# Patient Record
Sex: MALE | Race: WHITE | Employment: FULL TIME | URBAN - METROPOLITAN AREA
[De-identification: names, ages, dates, MRNs, and addresses within clinical notes are randomized per-mention and may not be internally consistent; named-entity substitution may affect disease eponyms.]

---

## 2017-10-03 ENCOUNTER — HOSPITAL ENCOUNTER (OUTPATIENT)
Dept: LAB | Age: 27
Discharge: HOME | End: 2017-10-03

## 2017-10-03 ENCOUNTER — HOSPITAL ENCOUNTER (OUTPATIENT)
Dept: ULTRASOUND IMAGING | Age: 27
Discharge: HOME | End: 2017-10-03
Payer: COMMERCIAL

## 2017-10-03 DIAGNOSIS — R74.8 ELEVATED LIVER ENZYMES: ICD-10-CM

## 2017-10-03 LAB — SENTARA SPECIMEN COL,SENBCF: NORMAL

## 2017-10-03 PROCEDURE — 76705 ECHO EXAM OF ABDOMEN: CPT

## 2017-10-03 PROCEDURE — 99001 SPECIMEN HANDLING PT-LAB: CPT | Performed by: INTERNAL MEDICINE

## 2020-06-29 ENCOUNTER — HOSPITAL ENCOUNTER (INPATIENT)
Age: 30
LOS: 1 days | Discharge: HOME | DRG: 392 | End: 2020-06-30
Attending: EMERGENCY MEDICINE | Admitting: HOSPITALIST
Payer: COMMERCIAL

## 2020-06-29 ENCOUNTER — APPOINTMENT (OUTPATIENT)
Dept: CT IMAGING | Age: 30
DRG: 392 | End: 2020-06-29
Attending: PHYSICIAN ASSISTANT
Payer: COMMERCIAL

## 2020-06-29 DIAGNOSIS — K40.20 BILATERAL INGUINAL HERNIA WITHOUT OBSTRUCTION OR GANGRENE, RECURRENCE NOT SPECIFIED: ICD-10-CM

## 2020-06-29 DIAGNOSIS — K57.20 ABSCESS OF SIGMOID COLON DUE TO DIVERTICULITIS: Primary | ICD-10-CM

## 2020-06-29 DIAGNOSIS — K57.92 DIVERTICULITIS: ICD-10-CM

## 2020-06-29 PROBLEM — K65.1 PELVIC ABSCESS IN MALE (HCC): Status: ACTIVE | Noted: 2020-06-29

## 2020-06-29 PROBLEM — I10 HTN (HYPERTENSION): Status: ACTIVE | Noted: 2020-06-29

## 2020-06-29 LAB
ALBUMIN SERPL-MCNC: 4 G/DL (ref 3.4–5)
ALBUMIN/GLOB SERPL: 1.1 {RATIO} (ref 0.8–1.7)
ALP SERPL-CCNC: 68 U/L (ref 45–117)
ALT SERPL-CCNC: 29 U/L (ref 16–61)
ANION GAP SERPL CALC-SCNC: 3 MMOL/L (ref 3–18)
AST SERPL-CCNC: 18 U/L (ref 10–38)
BASOPHILS # BLD: 0 K/UL (ref 0–0.1)
BASOPHILS NFR BLD: 0 % (ref 0–2)
BILIRUB SERPL-MCNC: 0.7 MG/DL (ref 0.2–1)
BUN SERPL-MCNC: 9 MG/DL (ref 7–18)
BUN/CREAT SERPL: 10 (ref 12–20)
CALCIUM SERPL-MCNC: 9.1 MG/DL (ref 8.5–10.1)
CHLORIDE SERPL-SCNC: 104 MMOL/L (ref 100–111)
CO2 SERPL-SCNC: 31 MMOL/L (ref 21–32)
CREAT SERPL-MCNC: 0.93 MG/DL (ref 0.6–1.3)
DIFFERENTIAL METHOD BLD: ABNORMAL
EOSINOPHIL # BLD: 0.1 K/UL (ref 0–0.4)
EOSINOPHIL NFR BLD: 1 % (ref 0–5)
ERYTHROCYTE [DISTWIDTH] IN BLOOD BY AUTOMATED COUNT: 11.9 % (ref 11.6–14.5)
GLOBULIN SER CALC-MCNC: 3.6 G/DL (ref 2–4)
GLUCOSE SERPL-MCNC: 106 MG/DL (ref 74–99)
HCT VFR BLD AUTO: 44.5 % (ref 36–48)
HGB BLD-MCNC: 16.1 G/DL (ref 13–16)
LYMPHOCYTES # BLD: 1.4 K/UL (ref 0.9–3.6)
LYMPHOCYTES NFR BLD: 16 % (ref 21–52)
MCH RBC QN AUTO: 31.9 PG (ref 24–34)
MCHC RBC AUTO-ENTMCNC: 36.2 G/DL (ref 31–37)
MCV RBC AUTO: 88.1 FL (ref 74–97)
MONOCYTES # BLD: 1.1 K/UL (ref 0.05–1.2)
MONOCYTES NFR BLD: 13 % (ref 3–10)
NEUTS SEG # BLD: 6.1 K/UL (ref 1.8–8)
NEUTS SEG NFR BLD: 70 % (ref 40–73)
PLATELET # BLD AUTO: 263 K/UL (ref 135–420)
PMV BLD AUTO: 9.2 FL (ref 9.2–11.8)
POTASSIUM SERPL-SCNC: 4.2 MMOL/L (ref 3.5–5.5)
PROT SERPL-MCNC: 7.6 G/DL (ref 6.4–8.2)
RBC # BLD AUTO: 5.05 M/UL (ref 4.7–5.5)
SODIUM SERPL-SCNC: 138 MMOL/L (ref 136–145)
WBC # BLD AUTO: 8.7 K/UL (ref 4.6–13.2)

## 2020-06-29 PROCEDURE — 96368 THER/DIAG CONCURRENT INF: CPT

## 2020-06-29 PROCEDURE — 65270000029 HC RM PRIVATE

## 2020-06-29 PROCEDURE — 74177 CT ABD & PELVIS W/CONTRAST: CPT

## 2020-06-29 PROCEDURE — 96365 THER/PROPH/DIAG IV INF INIT: CPT

## 2020-06-29 PROCEDURE — 74011250636 HC RX REV CODE- 250/636: Performed by: PHYSICIAN ASSISTANT

## 2020-06-29 PROCEDURE — 74011636320 HC RX REV CODE- 636/320: Performed by: EMERGENCY MEDICINE

## 2020-06-29 PROCEDURE — 99284 EMERGENCY DEPT VISIT MOD MDM: CPT

## 2020-06-29 PROCEDURE — 85025 COMPLETE CBC W/AUTO DIFF WBC: CPT

## 2020-06-29 PROCEDURE — 80053 COMPREHEN METABOLIC PANEL: CPT

## 2020-06-29 PROCEDURE — 74011250636 HC RX REV CODE- 250/636: Performed by: INTERNAL MEDICINE

## 2020-06-29 PROCEDURE — 96375 TX/PRO/DX INJ NEW DRUG ADDON: CPT

## 2020-06-29 RX ORDER — CIPROFLOXACIN 2 MG/ML
400 INJECTION, SOLUTION INTRAVENOUS
Status: COMPLETED | OUTPATIENT
Start: 2020-06-29 | End: 2020-06-29

## 2020-06-29 RX ORDER — HEPARIN SODIUM 5000 [USP'U]/ML
5000 INJECTION, SOLUTION INTRAVENOUS; SUBCUTANEOUS EVERY 8 HOURS
Status: DISCONTINUED | OUTPATIENT
Start: 2020-06-29 | End: 2020-06-30 | Stop reason: HOSPADM

## 2020-06-29 RX ORDER — CIPROFLOXACIN 2 MG/ML
400 INJECTION, SOLUTION INTRAVENOUS EVERY 12 HOURS
Status: DISCONTINUED | OUTPATIENT
Start: 2020-06-30 | End: 2020-06-30 | Stop reason: HOSPADM

## 2020-06-29 RX ORDER — METRONIDAZOLE 500 MG/100ML
500 INJECTION, SOLUTION INTRAVENOUS EVERY 6 HOURS
Status: DISCONTINUED | OUTPATIENT
Start: 2020-06-30 | End: 2020-06-30 | Stop reason: HOSPADM

## 2020-06-29 RX ORDER — SODIUM CHLORIDE, SODIUM LACTATE, POTASSIUM CHLORIDE, CALCIUM CHLORIDE 600; 310; 30; 20 MG/100ML; MG/100ML; MG/100ML; MG/100ML
100 INJECTION, SOLUTION INTRAVENOUS CONTINUOUS
Status: DISCONTINUED | OUTPATIENT
Start: 2020-06-29 | End: 2020-06-30 | Stop reason: HOSPADM

## 2020-06-29 RX ORDER — MORPHINE SULFATE 2 MG/ML
2 INJECTION, SOLUTION INTRAMUSCULAR; INTRAVENOUS
Status: COMPLETED | OUTPATIENT
Start: 2020-06-29 | End: 2020-06-29

## 2020-06-29 RX ORDER — METRONIDAZOLE 500 MG/100ML
500 INJECTION, SOLUTION INTRAVENOUS
Status: COMPLETED | OUTPATIENT
Start: 2020-06-29 | End: 2020-06-29

## 2020-06-29 RX ADMIN — HEPARIN SODIUM 5000 UNITS: 5000 INJECTION INTRAVENOUS; SUBCUTANEOUS at 22:59

## 2020-06-29 RX ADMIN — MORPHINE SULFATE 2 MG: 2 INJECTION, SOLUTION INTRAMUSCULAR; INTRAVENOUS at 15:58

## 2020-06-29 RX ADMIN — CIPROFLOXACIN 400 MG: 2 INJECTION, SOLUTION INTRAVENOUS at 17:34

## 2020-06-29 RX ADMIN — SODIUM CHLORIDE 1000 ML: 900 INJECTION, SOLUTION INTRAVENOUS at 17:34

## 2020-06-29 RX ADMIN — METRONIDAZOLE 500 MG: 500 INJECTION, SOLUTION INTRAVENOUS at 23:54

## 2020-06-29 RX ADMIN — IOPAMIDOL 98 ML: 612 INJECTION, SOLUTION INTRAVENOUS at 16:28

## 2020-06-29 RX ADMIN — SODIUM CHLORIDE, SODIUM LACTATE, POTASSIUM CHLORIDE, AND CALCIUM CHLORIDE 100 ML/HR: 600; 310; 30; 20 INJECTION, SOLUTION INTRAVENOUS at 21:47

## 2020-06-29 RX ADMIN — METRONIDAZOLE 500 MG: 500 INJECTION, SOLUTION INTRAVENOUS at 17:34

## 2020-06-29 NOTE — ED TRIAGE NOTES
Pt presents for LLQ pain/scrotal pain and dysuria s/p lifiting \"481 lb hydraulic\" Pt thinks he has a hernia. Unable to go back in per pt.  Has had BM since then

## 2020-06-29 NOTE — ED PROVIDER NOTES
[address] EMERGENCY DEPT      Date: 6/29/2020  Patient Name: Debora Barber    History of Presenting Illness     Chief Complaint   Patient presents with    Abdominal Pain     26 y.o. male otherwise healthy presents the ED complaining of right lower pelvic pain onset earlier this morning. Patient states he had gotten to work early and was attempting to lift a 500 pound cylinder by himself when he felt a pain/swelling to his pelvis. Pt had a prior right inguinal hernia repair with mesh and states this feels similar to when he had the hernia. He reports having normal bowel movement today. Denies any fever, chills, nausea or vomiting, diarrhea or constipation, urinary complaints, other symptoms at this time. Patient denies any other associated signs or symptoms. Patient denies any other complaints. Nursing notes regarding the HPI and triage nursing notes were reviewed. Prior medical records were reviewed. Current Facility-Administered Medications   Medication Dose Route Frequency Provider Last Rate Last Dose    ciprofloxacin (CIPRO) 400 mg in D5W IVPB (premix)  400 mg IntraVENous NOW Brenna Sawant  mL/hr at 06/29/20 1734 400 mg at 06/29/20 1734    metroNIDAZOLE (FLAGYL) IVPB premix 500 mg  500 mg IntraVENous NOW TOMASZ Ryder 100 mL/hr at 06/29/20 1734 500 mg at 06/29/20 1734       Past History     Past Medical History:  History reviewed. No pertinent past medical history. Past Surgical History:  Past Surgical History:   Procedure Laterality Date    HX HERNIA REPAIR  2-23-10    right inguinal hernia repair with mesh       Family History:  History reviewed. No pertinent family history. Social History:  Social History     Tobacco Use    Smoking status: Current Every Day Smoker    Smokeless tobacco: Never Used   Substance Use Topics    Alcohol use:  Yes    Drug use: No       Allergies:  No Known Allergies    Patient's primary care provider (as noted in EPIC):  Maxwell Jimenez Mitch Hameed MD    Review of Systems   Constitutional:  Denies malaise, fever, chills. GI/ABD:  Denies injury, pain, distention, nausea, vomiting, diarrhea. :  Denies injury, pain, dysuria or urgency. Back:  Denies injury or pain. Pelvis: + right inguinal/pelvic pain. Neuro:  Denies neurologic symptoms/deficits/paresthesias. Skin: Denies injury, rash, itching or skin changes. All other systems negative as reviewed. Visit Vitals  BP (!) 137/94   Pulse (!) 108   Temp 97.9 °F (36.6 °C)   Resp 15   Wt 77.1 kg (170 lb)   SpO2 97%   BMI 23.71 kg/m²       Patient Vitals for the past 12 hrs:   Temp Pulse Resp BP SpO2   06/29/20 1444 97.9 °F (36.6 °C) (!) 108 15 (!) 137/94 97 %       PHYSICAL EXAM:    CONSTITUTIONAL:  Alert, in no apparent distress;  well developed;  well nourished. HEAD:  Normocephalic, atraumatic. EYES:  EOMI. Non-icteric sclera. Normal conjunctiva. ENTM:  Mouth: mucous membranes moist.  NECK:  Supple  RESPIRATORY:  Chest clear, equal breath sounds, good air movement. Without wheezes, rhonchi or rales. CARDIOVASCULAR:  Regular rate and rhythm. No murmurs, rubs, or gallops. GI:  Normal bowel sounds, abdomen soft with mild TTP to suprapubic region. No rebound or guarding. BACK:  Non-tender. NEURO:  Moves all four extremities, and grossly normal motor exam.  SKIN:  No rashes;  Normal for age. PSYCH:  Alert and normal affect. DIFFERENTIAL DIAGNOSES/ MEDICAL DECISION MAKING:  DDX: Hernia, appendicitis pain, diverticulitis, urinary tract infection, obstruction, abdominal wall pain, vs other etiology.      Recent Results (from the past 12 hour(s))   CBC WITH AUTOMATED DIFF    Collection Time: 06/29/20  3:37 PM   Result Value Ref Range    WBC 8.7 4.6 - 13.2 K/uL    RBC 5.05 4.70 - 5.50 M/uL    HGB 16.1 (H) 13.0 - 16.0 g/dL    HCT 44.5 36.0 - 48.0 %    MCV 88.1 74.0 - 97.0 FL    MCH 31.9 24.0 - 34.0 PG    MCHC 36.2 31.0 - 37.0 g/dL    RDW 11.9 11.6 - 14.5 %    PLATELET 538 926 - 420 K/uL    MPV 9.2 9.2 - 11.8 FL    NEUTROPHILS 70 40 - 73 %    LYMPHOCYTES 16 (L) 21 - 52 %    MONOCYTES 13 (H) 3 - 10 %    EOSINOPHILS 1 0 - 5 %    BASOPHILS 0 0 - 2 %    ABS. NEUTROPHILS 6.1 1.8 - 8.0 K/UL    ABS. LYMPHOCYTES 1.4 0.9 - 3.6 K/UL    ABS. MONOCYTES 1.1 0.05 - 1.2 K/UL    ABS. EOSINOPHILS 0.1 0.0 - 0.4 K/UL    ABS. BASOPHILS 0.0 0.0 - 0.1 K/UL    DF AUTOMATED     METABOLIC PANEL, COMPREHENSIVE    Collection Time: 06/29/20  3:37 PM   Result Value Ref Range    Sodium 138 136 - 145 mmol/L    Potassium 4.2 3.5 - 5.5 mmol/L    Chloride 104 100 - 111 mmol/L    CO2 31 21 - 32 mmol/L    Anion gap 3 3.0 - 18 mmol/L    Glucose 106 (H) 74 - 99 mg/dL    BUN 9 7.0 - 18 MG/DL    Creatinine 0.93 0.6 - 1.3 MG/DL    BUN/Creatinine ratio 10 (L) 12 - 20      GFR est AA >60 >60 ml/min/1.73m2    GFR est non-AA >60 >60 ml/min/1.73m2    Calcium 9.1 8.5 - 10.1 MG/DL    Bilirubin, total 0.7 0.2 - 1.0 MG/DL    ALT (SGPT) 29 16 - 61 U/L    AST (SGOT) 18 10 - 38 U/L    Alk. phosphatase 68 45 - 117 U/L    Protein, total 7.6 6.4 - 8.2 g/dL    Albumin 4.0 3.4 - 5.0 g/dL    Globulin 3.6 2.0 - 4.0 g/dL    A-G Ratio 1.1 0.8 - 1.7        Ct Abd Pelv W Cont    Result Date: 6/29/2020  EXAM: CT of the abdomen and pelvis INDICATION: 26year-old patient with left lower quadrant abdominal/scrotal pain after lifting injury COMPARISON: None. TECHNIQUE: Axial CT imaging of the abdomen and pelvis was performed with intravenous contrast. Multiplanar reformats were generated. One or more dose reduction techniques were used on this CT: automated exposure control, adjustment of the mAs and/or kVp according to patient size, and iterative reconstruction techniques. The specific techniques used on this CT exam have been documented in the patient's electronic medical record.   Digital Imaging and Communications in Medicine (DICOM) format image data are available to nonaffiliated external healthcare facilities or entities on a secure, media free, reciprocally searchable basis with patient authorization for at least a 12-month period after this study. _______________ FINDINGS: LOWER CHEST: Unremarkable. LIVER, BILIARY: Mild diffuse hepatic hypoattenuation is present without evidence of focal hepatic lesion. No biliary dilation. Gallbladder is unremarkable. PANCREAS: Normal. SPLEEN: Normal. ADRENALS: Normal. 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 There is no evidence of hydronephrosis. Lobular renal contours are noted. No nephrolithiasis. No distal ureteral or urinary bladder stone. PELVIC ORGANS: Small fat-containing inguinal hernias are noted bilaterally without significant stranding. VASCULATURE: Unremarkable LYMPH NODES: No enlarged lymph nodes. GASTROINTESTINAL TRACT: There is focal peridiverticular inflammation involving the sigmoid colon with induration of the pericolonic fat planes, colonic wall thickening, and small quantity of reactive fluid along the adjacent mesentery and inferior left paracolic gutter. Focal low attenuating gas and fluid collection within or adjacent to the inflamed segment of the colon estimated at approximately 2.5 x 1.3 x 1.9 cm in maximal dimension. There is no gross free intraperitoneal gas demonstrated. Remaining small and large bowel are of normal course and caliber. Normal appendix. BONES: No acute or aggressive osseous abnormalities identified. Lower lumbar spondylosis, mild in nature L5-S1 with accompanying vacuum disc phenomenon. OTHER: None. _______________     IMPRESSION: 1. Acute sigmoid diverticulitis without evidence of gross free intraperitoneal perforation. > Small incompletely rim-enhancing fluid and gas collection adjacent to the inflamed colonic segment measuring approximately 2.5 x 1.3 x 1.9 cm in size. Portions of the collection may be intramural in location. 2. Small bilateral fat-containing inguinal hernias.      ED COURSE AND MEDICAL DECISION MAKING:      IV cirpo and flagyl ordered for patient due to diverticulitis with abscess formation. 5:25 PM Dr. Joselyn Briceño spoke with Dr. Tracy Warner who states the abscess is too small to operate on he is requesting admission to hospitalist as well as IR consult. 5:37 PM Consult with Dr. Conchis Negron who states he will accept the patient for admission. Informed Oralia, , I need to consult IR. She states they are not here and will attempt to call them but does not know if we will be able to get in touch with them this evening. Diagnosis:   1. Abscess of sigmoid colon due to diverticulitis    2.  Bilateral inguinal hernia without obstruction or gangrene, recurrence not specified      Disposition: Admission     TOMASZ Burgess

## 2020-06-29 NOTE — H&P
Internal Medicine History and Physical  Note           NAME:  Claudia Gerber   :   1990   MRN:  [id number]     PCP:  Deshaun Dwyer MD     Date/Time:  2020 5:32 PM      I hereby certify this patient for admission based upon medical necessity as noted below:        Assessment / plan :        Principal Problem:    Diverticulitis (2020)    Active Problems:    HTN (hypertension) (2020)      Pelvic abscess in male [facility]) (2020)       #Acute diverticulitis with pelvic abscess. Admit to hospital for further management of acute diverticulitis. Continue IV Cipro and IV Flagyl. IV fluid  Of note patient is afebrile, no leukocytosis no active nausea or vomiting or change in bowel habits apart from constipation on Thursday. Repeat CT scan to follow-up on a small pelvic abscess  Serial labs as ordered  Surgery consulted by ER MD    # Hypertension. Control blood pressure  Patient report he supposed to be on blood pressure medicine started by PCP but he never took it as he did not feel well when he used it. Cannot remember the name of the medicine. -DVT prophylaxis . - Code Status : Full    -Other chronic medical problems as per past history. Further management depend on the course of the case and expanded data base. DISPO - pt to be admitted at this time for reasons addressed above, continued hospitalization for ongoing assessment and treatment indicated        Subjective:     CHIEF COMPLAINT: Lower abdominal pain    HISTORY OF PRESENT ILLNESS:     Mr. Renato Beckwith is a 26 y.o.  male who is admitted for acute diverticulitis with pelvic abscess. Mr. Renato Beckwith with past medical history as noted below , presented to the Emergency Department today  complaining of above. Triage and ER notes noted. Patient reported that he was lifting a heavy object at work today and suddenly started to experience lower abdominal pain and he thought he might have hernia. In the ER CT scan report acute diverticulitis in the sigmoid area and also small pelvic abscess about 2 cm. Patient was surprised with this diagnosis as he never had any problems the last couple of days but on further questioning he report on Thursday he developed some nauseated and was constipated but no fever or chills. Patient reported hernia surgery with mesh about 10 years ago otherwise no abdominal infections or other surgeries. He reports normal bowel habit normally and eating normal.  No urinary complaint. History reviewed. No pertinent past medical history. Past Surgical History:   Procedure Laterality Date    HX HERNIA REPAIR  2-23-10    right inguinal hernia repair with mesh       Social History     Tobacco Use    Smoking status: Current Every Day Smoker    Smokeless tobacco: Never Used   Substance Use Topics    Alcohol use: Yes        History reviewed. No pertinent family history.      No Known Allergies     Prior to Admission medications    Not on File       Review of Systems:  (bold if positive, otherwise negative), apart from what mentioned in HPI  Apart from above patient deny any other significant complain  Gen:  Weight gain, weight loss, fever, chills, fatigue  Eyes:  Visual changes, pain, conjunctivitis  ENT:  Sore throat, rhinorrhea, decreased hearing  CVS:  Palpitations, chest pain, dizziness, syncope, edema, PND  Pulm:  Cough, dyspnea, sputum, hemoptysis, wheezing  GI:  Abdominal pain, nausea, emesis, diarrhea, constipation, GERD, melena  :  Hematuria, incontinence, nocturia, dysuria, discharge  MS:  Pain, weakness, swelling, arthritis  Skin:  Rash, erythema, abscess, wound, moles  Endo:  Heat intolerance, cold intolerance, weight gain, polyuria  Hem:  Enlarged nodes, bruising, bleeding, night sweats  Renal:  Edema, change in urine, flank pain  Neuro:  Numbness, tingling, weakness, seizure, headache, tremors       VITALS:    Vital signs reviewed; most recent are:    Visit Vitals  BP (!) 137/94   Pulse (!) 108   Temp 97.9 °F (36.6 °C)   Resp 15   Wt 77.1 kg (170 lb)   SpO2 97%   BMI 23.71 kg/m²     SpO2 Readings from Last 6 Encounters:   06/29/20 97%   10/21/13 98%        No intake or output data in the 24 hours ending 06/29/20 1523     Physical Exam:     Gen:  Appear stated age, Well-developed, well-nourished, in no acute distress  HEENT:  Head atraumatic, normocephalic , hearing intact to voice, moist mucous membranes. Neck:  Trachea midline , No apparent JVD, Supple   Resp:  No accessory muscle use,Bilateral BS present, clear breath sounds without wheezes rales or rhonchi  Card:  No murmurs, normal S1, S2 without Gallop . No Significant lower leg peripheral edema. Abd:  Soft, non-tender, non-distended, bowel sounds are present . Musc:  No cyanosis or clubbing. Skin:  No rashes or ulcers, skin turgor is good. Neuro:  Cranial nerves are grossly intact, no clear area of focal motor weakness, follows commands appropriately. Psych:  Good insight, oriented to person, place and time, alert. Labs: All Cardiac Markers in the last 24 hours: No results found for: CPK, CK, CKMMB, CKMB, RCK3, CKMBT, CKNDX, CKND1, THAD, TROPT, TROIQ, JADEN, TROPT, TNIPOC, BNP, BNPP    Recent Labs     06/29/20  1537   WBC 8.7   HGB 16.1*   HCT 44.5        Recent Labs     06/29/20  1537      K 4.2      CO2 31   *   BUN 9   CREA 0.93   CA 9.1   ALB 4.0   TBILI 0.7   ALT 29     No results found for: GLUCPOC  No results for input(s): PH, PCO2, PO2, HCO3, FIO2 in the last 72 hours. No results for input(s): INR, INREXT, INREXT in the last 72 hours. Ct Abd Pelv W Cont    Result Date: 6/29/2020  IMPRESSION: 1. Acute sigmoid diverticulitis without evidence of gross free intraperitoneal perforation. > Small incompletely rim-enhancing fluid and gas collection adjacent to the inflamed colonic segment measuring approximately 2.5 x 1.3 x 1.9 cm in size.  Portions of the collection may be intramural in location. 2. Small bilateral fat-containing inguinal hernias. Please refer to radiology reports. Risk of deterioration: high      Total time spent in the care of this patient: [address] discussed with: Patient, Nursing Staff and >50% of time spent in counseling and coordination of care      Discussed:  Care Plan       I have personally reviewed all pertinent labs, films and EKGs that have officially resulted. I reviewed available pertaining electronic documentation outlining the initial presentation as well as the emergency room physician's encounter. This document in whole or part of it has been produced using voice recognition software. Unrecognized errors in transcription may be present.     Attending Physician: James Cook MD

## 2020-06-29 NOTE — ED NOTES
[facility] Patient lying in bed, his cipro is infusing along with one liter of NS. His flagyl is complete. Pain is at zero per patient    2106  Gave report to Mary Alice Alfredo on the 2200 unit. This patient will be admitted to room 2203. He is currently in no pain and is aware why he is being admitted.

## 2020-06-30 VITALS
SYSTOLIC BLOOD PRESSURE: 129 MMHG | HEART RATE: 93 BPM | OXYGEN SATURATION: 97 % | DIASTOLIC BLOOD PRESSURE: 87 MMHG | TEMPERATURE: 98.1 F | WEIGHT: 190 LBS | RESPIRATION RATE: 20 BRPM | BODY MASS INDEX: 26.6 KG/M2 | HEIGHT: 71 IN

## 2020-06-30 PROBLEM — K57.80 DIVERTICULITIS OF INTESTINE WITH ABSCESS: Status: ACTIVE | Noted: 2020-06-30

## 2020-06-30 LAB
ANION GAP SERPL CALC-SCNC: 5 MMOL/L (ref 3–18)
BASOPHILS # BLD: 0 K/UL (ref 0–0.1)
BASOPHILS NFR BLD: 0 % (ref 0–2)
BUN SERPL-MCNC: 7 MG/DL (ref 7–18)
BUN/CREAT SERPL: 8 (ref 12–20)
CALCIUM SERPL-MCNC: 8.4 MG/DL (ref 8.5–10.1)
CHLORIDE SERPL-SCNC: 105 MMOL/L (ref 100–111)
CO2 SERPL-SCNC: 29 MMOL/L (ref 21–32)
CREAT SERPL-MCNC: 0.83 MG/DL (ref 0.6–1.3)
DIFFERENTIAL METHOD BLD: ABNORMAL
EOSINOPHIL # BLD: 0.1 K/UL (ref 0–0.4)
EOSINOPHIL NFR BLD: 1 % (ref 0–5)
ERYTHROCYTE [DISTWIDTH] IN BLOOD BY AUTOMATED COUNT: 11.9 % (ref 11.6–14.5)
GLUCOSE SERPL-MCNC: 76 MG/DL (ref 74–99)
HCT VFR BLD AUTO: 40.9 % (ref 36–48)
HGB BLD-MCNC: 14.5 G/DL (ref 13–16)
LYMPHOCYTES # BLD: 1.9 K/UL (ref 0.9–3.6)
LYMPHOCYTES NFR BLD: 22 % (ref 21–52)
MAGNESIUM SERPL-MCNC: 1.9 MG/DL (ref 1.6–2.6)
MCH RBC QN AUTO: 31.5 PG (ref 24–34)
MCHC RBC AUTO-ENTMCNC: 35.5 G/DL (ref 31–37)
MCV RBC AUTO: 88.7 FL (ref 74–97)
MONOCYTES # BLD: 1.1 K/UL (ref 0.05–1.2)
MONOCYTES NFR BLD: 14 % (ref 3–10)
NEUTS SEG # BLD: 5.3 K/UL (ref 1.8–8)
NEUTS SEG NFR BLD: 63 % (ref 40–73)
PHOSPHATE SERPL-MCNC: 2.9 MG/DL (ref 2.5–4.9)
PLATELET # BLD AUTO: 249 K/UL (ref 135–420)
PMV BLD AUTO: 9.8 FL (ref 9.2–11.8)
POTASSIUM SERPL-SCNC: 3.8 MMOL/L (ref 3.5–5.5)
RBC # BLD AUTO: 4.61 M/UL (ref 4.7–5.5)
SODIUM SERPL-SCNC: 139 MMOL/L (ref 136–145)
WBC # BLD AUTO: 8.4 K/UL (ref 4.6–13.2)

## 2020-06-30 PROCEDURE — 83735 ASSAY OF MAGNESIUM: CPT

## 2020-06-30 PROCEDURE — 99218 HC RM OBSERVATION: CPT

## 2020-06-30 PROCEDURE — 80048 BASIC METABOLIC PNL TOTAL CA: CPT

## 2020-06-30 PROCEDURE — 84100 ASSAY OF PHOSPHORUS: CPT

## 2020-06-30 PROCEDURE — 85025 COMPLETE CBC W/AUTO DIFF WBC: CPT

## 2020-06-30 PROCEDURE — 74011250636 HC RX REV CODE- 250/636: Performed by: INTERNAL MEDICINE

## 2020-06-30 PROCEDURE — 36415 COLL VENOUS BLD VENIPUNCTURE: CPT

## 2020-06-30 RX ORDER — METRONIDAZOLE 500 MG/1
500 TABLET ORAL 3 TIMES DAILY
Qty: 21 TAB | Refills: 0 | Status: SHIPPED | OUTPATIENT
Start: 2020-06-30 | End: 2020-07-07

## 2020-06-30 RX ORDER — CEPHALEXIN 500 MG/1
500 CAPSULE ORAL 3 TIMES DAILY
Qty: 21 CAP | Refills: 0 | Status: SHIPPED | OUTPATIENT
Start: 2020-06-30 | End: 2020-07-07

## 2020-06-30 RX ADMIN — METRONIDAZOLE 500 MG: 500 INJECTION, SOLUTION INTRAVENOUS at 11:23

## 2020-06-30 RX ADMIN — METRONIDAZOLE 500 MG: 500 INJECTION, SOLUTION INTRAVENOUS at 05:56

## 2020-06-30 RX ADMIN — SODIUM CHLORIDE, SODIUM LACTATE, POTASSIUM CHLORIDE, AND CALCIUM CHLORIDE 100 ML/HR: 600; 310; 30; 20 INJECTION, SOLUTION INTRAVENOUS at 11:18

## 2020-06-30 RX ADMIN — HEPARIN SODIUM 5000 UNITS: 5000 INJECTION INTRAVENOUS; SUBCUTANEOUS at 05:56

## 2020-06-30 RX ADMIN — CIPROFLOXACIN 400 MG: 2 INJECTION, SOLUTION INTRAVENOUS at 06:00

## 2020-06-30 NOTE — CONSULTS
General Surgery Consult    [address] date: 2020    MRN: [id number]     : 1990     Age: 26 y.o. Attending Physician: Kristopher Ellison MD, [facility]      History of Present Illness: Debora Barber is a 26 y.o. male who was admitted last night to the medical service for acute diverticulitis with a small abscess. The patient has a very interesting history where he presented he said last Thursday with a sudden onset of abdominal pain. He said that the pain was gone the next day and then he felt better and then all of a sudden yesterday the pain happened again. What is interesting is that the patient described the pain to be located in the right side of the abdomen mainly toward the suprapubic and the right groin area. He thought it could be a hernia because he was doing some heavy lifting but the CT scan in the emergency room showed the diverticulitis with a small abscess of about 2.2 cm. He currently feels great and he said he denies any abdominal pain. He feels hungry. His vital signs are normal with no fever or tachycardia but he was tachycardic when he presented to the emergency room yesterday. His WBC yesterday and today are normal.  His creatinine is normal.  He had a previous abdominal wall hernia repair with mesh in the past but no other abdominal surgeries. Patient Active Problem List    Diagnosis Date Noted    Diverticulitis 2020    HTN (hypertension) 2020    Pelvic abscess in [facility]) 2020     History reviewed. No pertinent past medical history.    Past Surgical History:   Procedure Laterality Date    HX HERNIA REPAIR  2-23-10    right inguinal hernia repair with mesh      Social History     Tobacco Use    Smoking status: Current Every Day Smoker    Smokeless tobacco: Never Used   Substance Use Topics    Alcohol use: Yes      Social History     Tobacco Use   Smoking Status Current Every Day Smoker   Smokeless Tobacco Never Used     History reviewed. No pertinent family history. Current Facility-Administered Medications   Medication Dose Route Frequency    ciprofloxacin (CIPRO) 400 mg in D5W IVPB (premix)  400 mg IntraVENous Q12H    metroNIDAZOLE (FLAGYL) IVPB premix 500 mg  500 mg IntraVENous Q6H    lactated Ringers infusion  100 mL/hr IntraVENous CONTINUOUS    heparin (porcine) injection 5,000 Units  5,000 Units SubCUTAneous Q8H      No Known Allergies     Review of Systems:  Constitutional: negative  Eyes: negative  Ears, Nose, Mouth, Throat, and Face: negative  Respiratory: negative  Cardiovascular: negative  Gastrointestinal: positive for abdominal pain  Genitourinary:negative  Integument/Breast: negative  Hematologic/Lymphatic: negative  Musculoskeletal:negative  Neurological: negative  Behavioral/Psychiatric: negative  Endocrine: negative  Allergic/Immunologic: negative    Objective:     Visit Vitals  /89   Pulse 94   Temp 98.3 °F (36.8 °C)   Resp 18   Ht 5' 11\" (1.803 m)   Wt 86.2 kg (190 lb)   SpO2 100%   BMI 26.50 kg/m²       Physical Exam:      General:  in no apparent distress, non-toxic, alert, oriented times 3, afebrile and normal vitals   Eyes:  conjunctivae and sclerae normal, pupils equal, round, reactive to light   Throat & Neck: no erythema or exudates noted and neck supple and symmetrical; no palpable masses   Lungs:   clear to auscultation bilaterally   Heart:  Regular rate and rhythm   Abdomen:   rounded, soft, nontender, nondistended, no masses or organomegaly. No abdominal wall hernias.     Extremities: extremities normal, atraumatic, no cyanosis or edema   Skin: Normal.       Imaging and Lab Review:     CBC:   Lab Results   Component Value Date/Time    WBC 8.4 06/30/2020 02:10 AM    RBC 4.61 (L) 06/30/2020 02:10 AM    HGB 14.5 06/30/2020 02:10 AM    HCT 40.9 06/30/2020 02:10 AM    PLATELET 684 02/73/9503 02:10 AM     BMP:   Lab Results   Component Value Date/Time    Glucose 76 06/30/2020 02:10 AM    Sodium 139 06/30/2020 02:10 AM    Potassium 3.8 06/30/2020 02:10 AM    Chloride 105 06/30/2020 02:10 AM    CO2 29 06/30/2020 02:10 AM    BUN 7 06/30/2020 02:10 AM    Creatinine 0.83 06/30/2020 02:10 AM    Calcium 8.4 (L) 06/30/2020 02:10 AM     CMP:  Lab Results   Component Value Date/Time    Glucose 76 06/30/2020 02:10 AM    Sodium 139 06/30/2020 02:10 AM    Potassium 3.8 06/30/2020 02:10 AM    Chloride 105 06/30/2020 02:10 AM    CO2 29 06/30/2020 02:10 AM    BUN 7 06/30/2020 02:10 AM    Creatinine 0.83 06/30/2020 02:10 AM    Calcium 8.4 (L) 06/30/2020 02:10 AM    Anion gap 5 06/30/2020 02:10 AM    BUN/Creatinine ratio 8 (L) 06/30/2020 02:10 AM    Alk. phosphatase 68 06/29/2020 03:37 PM    Protein, total 7.6 06/29/2020 03:37 PM    Albumin 4.0 06/29/2020 03:37 PM    Globulin 3.6 06/29/2020 03:37 PM    A-G Ratio 1.1 06/29/2020 03:37 PM       Recent Results (from the past 24 hour(s))   CBC WITH AUTOMATED DIFF    Collection Time: 06/29/20  3:37 PM   Result Value Ref Range    WBC 8.7 4.6 - 13.2 K/uL    RBC 5.05 4.70 - 5.50 M/uL    HGB 16.1 (H) 13.0 - 16.0 g/dL    HCT 44.5 36.0 - 48.0 %    MCV 88.1 74.0 - 97.0 FL    MCH 31.9 24.0 - 34.0 PG    MCHC 36.2 31.0 - 37.0 g/dL    RDW 11.9 11.6 - 14.5 %    PLATELET 643 128 - 617 K/uL    MPV 9.2 9.2 - 11.8 FL    NEUTROPHILS 70 40 - 73 %    LYMPHOCYTES 16 (L) 21 - 52 %    MONOCYTES 13 (H) 3 - 10 %    EOSINOPHILS 1 0 - 5 %    BASOPHILS 0 0 - 2 %    ABS. NEUTROPHILS 6.1 1.8 - 8.0 K/UL    ABS. LYMPHOCYTES 1.4 0.9 - 3.6 K/UL    ABS. MONOCYTES 1.1 0.05 - 1.2 K/UL    ABS. EOSINOPHILS 0.1 0.0 - 0.4 K/UL    ABS.  BASOPHILS 0.0 0.0 - 0.1 K/UL    DF AUTOMATED     METABOLIC PANEL, COMPREHENSIVE    Collection Time: 06/29/20  3:37 PM   Result Value Ref Range    Sodium 138 136 - 145 mmol/L    Potassium 4.2 3.5 - 5.5 mmol/L    Chloride 104 100 - 111 mmol/L    CO2 31 21 - 32 mmol/L    Anion gap 3 3.0 - 18 mmol/L    Glucose 106 (H) 74 - 99 mg/dL    BUN 9 7.0 - 18 MG/DL    Creatinine 0.93 0.6 - 1.3 MG/DL BUN/Creatinine ratio 10 (L) 12 - 20      GFR est AA >60 >60 ml/min/1.73m2    GFR est non-AA >60 >60 ml/min/1.73m2    Calcium 9.1 8.5 - 10.1 MG/DL    Bilirubin, total 0.7 0.2 - 1.0 MG/DL    ALT (SGPT) 29 16 - 61 U/L    AST (SGOT) 18 10 - 38 U/L    Alk. phosphatase 68 45 - 117 U/L    Protein, total 7.6 6.4 - 8.2 g/dL    Albumin 4.0 3.4 - 5.0 g/dL    Globulin 3.6 2.0 - 4.0 g/dL    A-G Ratio 1.1 0.8 - 1.7     CBC WITH AUTOMATED DIFF    Collection Time: 06/30/20  2:10 AM   Result Value Ref Range    WBC 8.4 4.6 - 13.2 K/uL    RBC 4.61 (L) 4.70 - 5.50 M/uL    HGB 14.5 13.0 - 16.0 g/dL    HCT 40.9 36.0 - 48.0 %    MCV 88.7 74.0 - 97.0 FL    MCH 31.5 24.0 - 34.0 PG    MCHC 35.5 31.0 - 37.0 g/dL    RDW 11.9 11.6 - 14.5 %    PLATELET 295 578 - 122 K/uL    MPV 9.8 9.2 - 11.8 FL    NEUTROPHILS 63 40 - 73 %    LYMPHOCYTES 22 21 - 52 %    MONOCYTES 14 (H) 3 - 10 %    EOSINOPHILS 1 0 - 5 %    BASOPHILS 0 0 - 2 %    ABS. NEUTROPHILS 5.3 1.8 - 8.0 K/UL    ABS. LYMPHOCYTES 1.9 0.9 - 3.6 K/UL    ABS. MONOCYTES 1.1 0.05 - 1.2 K/UL    ABS. EOSINOPHILS 0.1 0.0 - 0.4 K/UL    ABS.  BASOPHILS 0.0 0.0 - 0.1 K/UL    DF AUTOMATED     METABOLIC PANEL, BASIC    Collection Time: 06/30/20  2:10 AM   Result Value Ref Range    Sodium 139 136 - 145 mmol/L    Potassium 3.8 3.5 - 5.5 mmol/L    Chloride 105 100 - 111 mmol/L    CO2 29 21 - 32 mmol/L    Anion gap 5 3.0 - 18 mmol/L    Glucose 76 74 - 99 mg/dL    BUN 7 7.0 - 18 MG/DL    Creatinine 0.83 0.6 - 1.3 MG/DL    BUN/Creatinine ratio 8 (L) 12 - 20      GFR est AA >60 >60 ml/min/1.73m2    GFR est non-AA >60 >60 ml/min/1.73m2    Calcium 8.4 (L) 8.5 - 10.1 MG/DL   MAGNESIUM    Collection Time: 06/30/20  2:10 AM   Result Value Ref Range    Magnesium 1.9 1.6 - 2.6 mg/dL   PHOSPHORUS    Collection Time: 06/30/20  2:10 AM   Result Value Ref Range    Phosphorus 2.9 2.5 - 4.9 MG/DL       images and reports reviewed    Assessment:   Fazal Granger is a 26 y.o. male is presenting with abdominal pain and a picture of acute complicated diverticulitis with a small abscess on CT scan. I am surprised that the CT scan showed diverticulitis because the patient history does not suggest that and I thought the patient may really have an abdominal wall hernia or a muscle tear. The patient currently is feeling well with normal WBC and no fever or tachycardia and his abdominal exam is benign. I think we can ask IR for evaluation to see if they can drain this abscess however I believe that this cannot be difficult because it is very small. If they cannot drain it I think that is okay and the patient can be started on liquid diet and advance to regular and switch to p.o. antibiotics. Plan:     I appreciate medicine admission and management  N.p.o. for now  IV antibiotics  IV hydration  IR evaluation for possible drainage of the diverticular abscess. If cannot be done then the patient can be started on diet and switch to p.o. antibiotic with discharge planning today or tomorrow if that is okay with the primary team.  Follow-up with PCP  Follow-up with GI for colonoscopy in 2 to 3 months  We will follow.     Please call me if you have any questions (cell phone: [phone])     Signed By: Ayden Pollard MD     June 30, 2020

## 2020-06-30 NOTE — PROGRESS NOTES
I gave the patient regular diet. If he tolerat and okay with primary team, he can be discharged on PO antibiotics today.

## 2020-06-30 NOTE — PROGRESS NOTES
Pt finished iv antibiotics. Discharge instructions provided on behalf of primary nurse Fabien Raygoza. Written prescriptions given to pt. Peripheral iv removed. Pt plans to drive self home.

## 2020-06-30 NOTE — PROGRESS NOTES
RADIOLOGY PROGRESS NOTE    I was asked to review for evaluation of possible drainage of small sigmoid diverticular abscess. I discussed with Dr. Carmella Closs my opinion that I would not intervene with drainage at this time. Suggest medical management. If continued concerning clinical findings despite medical management, can followup with repeat CT.     Ale Larson MD, MD  Radiology  [facility]  6/30/2020

## 2020-06-30 NOTE — PROGRESS NOTES
Problem: Falls - Risk of  Goal: *Absence of Falls  Description: Document Alessandra Reed Fall Risk and appropriate interventions in the flowsheet.   Outcome: Progressing Towards Goal  Note: Fall Risk Interventions:            Medication Interventions: Teach patient to arise slowly                   Problem: Patient Education: Go to Patient Education Activity  Goal: Patient/Family Education  Outcome: Progressing Towards Goal     Problem: Pain - Acute  Goal: *Control of acute pain  Outcome: Progressing Towards Goal     Problem: Patient Education: Go to Patient Education Activity  Goal: Patient/Family Education  Outcome: Progressing Towards Goal     Problem: Discharge Planning  Goal: *Discharge to safe environment  Outcome: Progressing Towards Goal  Goal: *Knowledge of medication management  Outcome: Progressing Towards Goal  Goal: *Knowledge of discharge instructions  Outcome: Progressing Towards Goal     Problem: Patient Education: Go to Patient Education Activity  Goal: Patient/Family Education  Outcome: Progressing Towards Goal

## 2020-06-30 NOTE — DISCHARGE SUMMARY
[facility]  Hospitalist Division    Discharge Summary    Patient: Keila Wright MRN: [id number]  [facility]: [id number]    YOB: 1990  Age: 26 y.o. Sex: male    DOA: 6/29/2020 LOS:  LOS: 1 day   Discharge Date:      Admission Diagnoses: Diverticulitis [K57.92]    Discharge Diagnoses:  Principal Problem:    Diverticulitis (6/29/2020)    Active Problems:    HTN (hypertension) (6/29/2020)      Pelvic abscess in male [facility]) (6/29/2020)      Diverticulitis of intestine with abscess (6/30/2020)        Discharge Condition: Stable    Discharge To: Home    Consults: General Surgery    HPI: Mr. Pritesh Cruz is a 26 y.o.  male who is admitted for acute diverticulitis with pelvic abscess. Patient reported that he was lifting a heavy object at work today and suddenly started to experience lower abdominal pain, and he thought he might have caused a hernia. In the ER, CT scan report acute diverticulitis in the sigmoid area and also a small pelvic abscess about 2 cm. Patient was surprised with this diagnosis as he never had any problems the last couple of days but on further questioning he report on Thursday he developed some nauseated and diarrhea, but no fever or chills. Patient reported hernia surgery with mesh about 10 years ago otherwise no abdominal infections or other surgeries. He reports normal appetite. No urinary complaint. Hospital Course: GS was consulted. Recommended IR eval for possible drainage; if not, discharge on PO abx. IR recommended medical mgmt, no drainage. Patient was given diet which he tolerated well. He denied abd pain, stated diarrhea was resolving. VS and labs stable for discharge home on PO abx.       Physical Exam:  General appearance: alert, cooperative, no distress, appears stated age  Head: Normocephalic, without obvious abnormality, atraumatic  Lungs: clear to auscultation bilaterally  Heart: regular rate and rhythm, S1, S2 normal, no murmur, click, rub or gallop  Abdomen: soft, non-tender. Bowel sounds normal. No masses,  no organomegaly  Extremities: no cyanosis or edema  Skin: Skin color, texture normal. No rashes or lesions  Neurologic: no focal deficits, motor/sensory intact  PSY: Mood and affect normal, appropriately behaved    Significant Diagnostic Studies:     BMP:   Lab Results   Component Value Date/Time     06/30/2020 02:10 AM    K 3.8 06/30/2020 02:10 AM     06/30/2020 02:10 AM    CO2 29 06/30/2020 02:10 AM    AGAP 5 06/30/2020 02:10 AM    GLU 76 06/30/2020 02:10 AM    BUN 7 06/30/2020 02:10 AM    CREA 0.83 06/30/2020 02:10 AM    GFRAA >60 06/30/2020 02:10 AM    GFRNA >60 06/30/2020 02:10 AM     CBC:   Lab Results   Component Value Date/Time    WBC 8.4 06/30/2020 02:10 AM    HGB 14.5 06/30/2020 02:10 AM    HCT 40.9 06/30/2020 02:10 AM     06/30/2020 02:10 AM       Ct Abd Pelv W Cont    Result Date: 6/29/2020  EXAM: CT of the abdomen and pelvis INDICATION: 26year-old patient with left lower quadrant abdominal/scrotal pain after lifting injury COMPARISON: None. TECHNIQUE: Axial CT imaging of the abdomen and pelvis was performed with intravenous contrast. Multiplanar reformats were generated. One or more dose reduction techniques were used on this CT: automated exposure control, adjustment of the mAs and/or kVp according to patient size, and iterative reconstruction techniques. The specific techniques used on this CT exam have been documented in the patient's electronic medical record. Digital Imaging and Communications in Medicine (DICOM) format image data are available to nonaffiliated external healthcare facilities or entities on a secure, media free, reciprocally searchable basis with patient authorization for at least a 12-month period after this study. _______________ FINDINGS: LOWER CHEST: Unremarkable. LIVER, BILIARY: Mild diffuse hepatic hypoattenuation is present without evidence of focal hepatic lesion.  No biliary dilation. Gallbladder is unremarkable. PANCREAS: Normal. SPLEEN: Normal. ADRENALS: Normal. 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 There is no evidence of hydronephrosis. Lobular renal contours are noted. No nephrolithiasis. No distal ureteral or urinary bladder stone. PELVIC ORGANS: Small fat-containing inguinal hernias are noted bilaterally without significant stranding. VASCULATURE: Unremarkable LYMPH NODES: No enlarged lymph nodes. GASTROINTESTINAL TRACT: There is focal peridiverticular inflammation involving the sigmoid colon with induration of the pericolonic fat planes, colonic wall thickening, and small quantity of reactive fluid along the adjacent mesentery and inferior left paracolic gutter. Focal low attenuating gas and fluid collection within or adjacent to the inflamed segment of the colon estimated at approximately 2.5 x 1.3 x 1.9 cm in maximal dimension. There is no gross free intraperitoneal gas demonstrated. Remaining small and large bowel are of normal course and caliber. Normal appendix. BONES: No acute or aggressive osseous abnormalities identified. Lower lumbar spondylosis, mild in nature L5-S1 with accompanying vacuum disc phenomenon. OTHER: None. _______________     IMPRESSION: 1. Acute sigmoid diverticulitis without evidence of gross free intraperitoneal perforation. > Small incompletely rim-enhancing fluid and gas collection adjacent to the inflamed colonic segment measuring approximately 2.5 x 1.3 x 1.9 cm in size. Portions of the collection may be intramural in location. 2. Small bilateral fat-containing inguinal hernias. Procedures: None    Discharge Medications:     Current Discharge Medication List      START taking these medications    Details   metroNIDAZOLE (FlagyL) 500 mg tablet Take 1 Tab by mouth three (3) times daily for 7 days.   Qty: 21 Tab, Refills: 0      cephALEXin (Keflex) 500 mg capsule Take 1 Cap by mouth three (3) times daily for 7 days.   Qty: 21 Cap, Refills: 0             Activity: Activity as tolerated    Diet: Regular Diet    Wound Care: None needed    Follow-up: 1 week with PCP    Discharge time: >35 minutes    [facility]  Office:  [phone]  Pager: [phone]      6/30/2020, 12:30 PM

## 2020-06-30 NOTE — PROGRESS NOTES
conducted an initial consultation and Spiritual Assessment for Tino España, who is a 26 y. o.,male. Patients Primary Language is: [place]. According to the patients EMR Mandaeism Affiliation is: Latter day. The reason the Patient came to the hospital is:   Patient Active Problem List    Diagnosis Date Noted    Diverticulitis 06/29/2020    HTN (hypertension) 06/29/2020    Pelvic abscess [facility] male [facility]) 06/29/2020        The  provided the following Interventions:  Initiated a relationship of care and support. Explored issues of jose david, spirituality and/or Adventism needs while hospitalized. Listened empathically. Provided chaplaincy education. Provided information about Spiritual Care Services. Offered prayer and assurance of continued prayers on patient's behalf. Chart reviewed. The following outcomes were achieved:  Patient shared some information about their medical narrative and spiritual journey/beliefs. Patient processed feeling about current hospitalization. Patient expressed gratitude for the 's visit. Assessment:  Patient did not indicate any spiritual or Adventism issues which require Spiritual Care Services interventions at this time. Patient does not have any Adventism/cultural needs that will affect patients preferences in health care. Plan:  Chaplains will continue to follow and will provide pastoral care on an as needed or requested basis.  recommends bedside caregivers page  on duty if patient shows signs of acute spiritual or emotional distress.     [address] [facility]   Staff [address] [place] [address]   [phone]

## 2020-06-30 NOTE — DISCHARGE INSTRUCTIONS
DISCHARGE SUMMARY from Nurse    PATIENT INSTRUCTIONS:    After general anesthesia or intravenous sedation, for 24 hours or while taking prescription Narcotics:  · Limit your activities  · Do not drive and operate hazardous machinery  · Do not make important personal or business decisions  · Do  not drink alcoholic beverages  · If you have not urinated within 8 hours after discharge, please contact your surgeon on call. Report the following to your surgeon:  · Excessive pain, swelling, redness or odor of or around the surgical area  · Temperature over 100.5  · Nausea and vomiting lasting longer than 4 hours or if unable to take medications  · Any signs of decreased circulation or nerve impairment to extremity: change in color, persistent  numbness, tingling, coldness or increase pain  · Any questions    What to do at Home:  Recommended activity: Activity as tolerated    If you experience any of the following symptoms persisent abdominal pain, nasusea, vomiting, blood in stool, fever, or chills, please follow up with primary care physician/emergency room. *  Please give a list of your current medications to your Primary Care Provider. *  Please update this list whenever your medications are discontinued, doses are      changed, or new medications (including over-the-counter products) are added. *  Please carry medication information at all times in case of emergency situations. These are general instructions for a healthy lifestyle:    No smoking/ No tobacco products/ Avoid exposure to second hand smoke  Surgeon General's Warning:  Quitting smoking now greatly reduces serious risk to your health.     Obesity, smoking, and sedentary lifestyle greatly increases your risk for illness    A healthy diet, regular physical exercise & weight monitoring are important for maintaining a healthy lifestyle    You may be retaining fluid if you have a history of heart failure or if you experience any of the following symptoms:  Weight gain of 3 pounds or more overnight or 5 pounds in a week, increased swelling in our hands or feet or shortness of breath while lying flat in bed. Please call your doctor as soon as you notice any of these symptoms; do not wait until your next office visit. The discharge information has been reviewed with the patient. The patient verbalized understanding. Discharge medications reviewed with the patient and appropriate educational materials and side effects teaching were provided. Patient armband removed and shredded.

## 2020-06-30 NOTE — ROUTINE PROCESS
0730:  Bedside and Verbal shift change report given to Elizabeth Loyd RN (oncoming nurse) by Yuridia Ragland (offgoing nurse). Report included the following information SBAR, Kardex, MAR and Recent Results. Patient to be NPO until IR evaluates patient. 0830:  Patient resting comfortably. 0915: Informed patient he will have a regular diet; possibly discharged on antibiotics.

## 2020-08-14 ENCOUNTER — HOSPITAL ENCOUNTER (OUTPATIENT)
Dept: CT IMAGING | Age: 30
Discharge: HOME | End: 2020-08-14
Attending: INTERNAL MEDICINE
Payer: COMMERCIAL

## 2020-08-14 DIAGNOSIS — R93.3 ABNORMAL FINDINGS ON DIAGNOSTIC IMAGING OF OTHER PARTS OF DIGESTIVE TRACT: ICD-10-CM

## 2020-08-14 LAB — CREAT UR-MCNC: 0.8 MG/DL (ref 0.6–1.3)

## 2020-08-14 PROCEDURE — 74011000255 HC RX REV CODE- 255: Performed by: INTERNAL MEDICINE

## 2020-08-14 PROCEDURE — 74011636320 HC RX REV CODE- 636/320: Performed by: INTERNAL MEDICINE

## 2020-08-14 PROCEDURE — 82565 ASSAY OF CREATININE: CPT

## 2020-08-14 PROCEDURE — 74177 CT ABD & PELVIS W/CONTRAST: CPT

## 2020-08-14 RX ORDER — BARIUM SULFATE 20 MG/ML
900 SUSPENSION ORAL
Status: COMPLETED | OUTPATIENT
Start: 2020-08-14 | End: 2020-08-14

## 2020-08-14 RX ADMIN — IOPAMIDOL 97 ML: 612 INJECTION, SOLUTION INTRAVENOUS at 15:39

## 2020-08-14 RX ADMIN — BARIUM SULFATE 900 ML: 20 SUSPENSION ORAL at 15:39

## 2022-03-19 PROBLEM — K57.92 DIVERTICULITIS: Status: ACTIVE | Noted: 2020-06-29

## 2022-03-19 PROBLEM — K57.80 DIVERTICULITIS OF INTESTINE WITH ABSCESS: Status: ACTIVE | Noted: 2020-06-30

## 2022-03-19 PROBLEM — I10 HTN (HYPERTENSION): Status: ACTIVE | Noted: 2020-06-29

## 2022-03-19 PROBLEM — K65.1 PELVIC ABSCESS IN MALE (HCC): Status: ACTIVE | Noted: 2020-06-29

## 2025-02-17 ENCOUNTER — HOSPITAL ENCOUNTER (EMERGENCY)
Facility: HOSPITAL | Age: 35
Discharge: HOME | End: 2025-02-17
Payer: COMMERCIAL

## 2025-02-17 VITALS
TEMPERATURE: 97.6 F | HEART RATE: 89 BPM | OXYGEN SATURATION: 99 % | BODY MASS INDEX: 27.86 KG/M2 | SYSTOLIC BLOOD PRESSURE: 135 MMHG | DIASTOLIC BLOOD PRESSURE: 85 MMHG | RESPIRATION RATE: 16 BRPM | HEIGHT: 71 IN | WEIGHT: 199 LBS

## 2025-02-17 DIAGNOSIS — L08.9 INFECTED SEBACEOUS CYST: Primary | ICD-10-CM

## 2025-02-17 DIAGNOSIS — L72.3 INFECTED SEBACEOUS CYST: Primary | ICD-10-CM

## 2025-02-17 PROCEDURE — 99283 EMERGENCY DEPT VISIT LOW MDM: CPT

## 2025-02-17 PROCEDURE — 6370000000 HC RX 637 (ALT 250 FOR IP): Performed by: EMERGENCY MEDICINE

## 2025-02-17 PROCEDURE — 10061 I&D ABSCESS COMP/MULTIPLE: CPT

## 2025-02-17 RX ORDER — IBUPROFEN 800 MG/1
800 TABLET, FILM COATED ORAL EVERY 8 HOURS PRN
Qty: 30 TABLET | Refills: 1 | Status: SHIPPED | OUTPATIENT
Start: 2025-02-17

## 2025-02-17 RX ORDER — ACETAMINOPHEN 500 MG
1000 TABLET ORAL 4 TIMES DAILY PRN
Qty: 30 TABLET | Refills: 1 | Status: SHIPPED | OUTPATIENT
Start: 2025-02-17

## 2025-02-17 RX ORDER — CEPHALEXIN 500 MG/1
1000 CAPSULE ORAL 2 TIMES DAILY
Qty: 40 CAPSULE | Refills: 0 | Status: SHIPPED | OUTPATIENT
Start: 2025-02-17 | End: 2025-02-27

## 2025-02-17 RX ADMIN — CEPHALEXIN 1000 MG: 250 CAPSULE ORAL at 20:00

## 2025-02-17 ASSESSMENT — PAIN - FUNCTIONAL ASSESSMENT: PAIN_FUNCTIONAL_ASSESSMENT: 0-10

## 2025-02-17 ASSESSMENT — PAIN SCALES - GENERAL: PAINLEVEL_OUTOF10: 7

## 2025-02-17 NOTE — ED TRIAGE NOTES
Pt presents ambulatory to ED for c/o raised red area to posterior neck that has been present x 1 month. Pt states area is now draining. Pt denies fevers. Pt c/o pain

## 2025-02-18 NOTE — ED PROVIDER NOTES
EMERGENCY DEPARTMENT HISTORY AND PHYSICAL EXAM      Date: 2/17/2025  Patient Name: Tyrone Wilkins    History of Presenting Illness     Chief Complaint   Patient presents with    Neck Pain    Neck abscess       History (Context): Tyrone Wilkins is a 34 y.o. male  presents to the ED today with chief complaint of a complaint of a painful area to his left neck worsening over the past several months.  Does not have history of diabetes.  Does not have history of abscesses previously.      PCP: Marietta Garcia MD    Current Facility-Administered Medications   Medication Dose Route Frequency Provider Last Rate Last Admin    cephALEXin (KEFLEX) capsule 1,000 mg  1,000 mg Oral NOW Faith Vang PA         Current Outpatient Medications   Medication Sig Dispense Refill    cephALEXin (KEFLEX) 500 MG capsule Take 2 capsules by mouth 2 times daily for 10 days 40 capsule 0    acetaminophen (TYLENOL) 500 MG tablet Take 2 tablets by mouth 4 times daily as needed for Pain 30 tablet 1    ibuprofen (ADVIL;MOTRIN) 800 MG tablet Take 1 tablet by mouth every 8 hours as needed for Pain 30 tablet 1       Past History     Past Medical History:   No past medical history on file.    Past Surgical History:  Past Surgical History:   Procedure Laterality Date    HERNIA REPAIR  2-23-10    right inguinal hernia repair with mesh       Family History:  No family history on file.    Social History:   Social History     Tobacco Use    Smoking status: Every Day    Smokeless tobacco: Never   Substance Use Topics    Alcohol use: Yes    Drug use: No       Allergies:  No Known Allergies      Physical Exam     Vitals:    02/17/25 1841   BP: (!) 149/109   Pulse: (!) 126   Resp: 16   Temp: 97.6 °F (36.4 °C)   TempSrc: Oral   SpO2: 99%   Weight: 90.3 kg (199 lb)   Height: 1.803 m (5' 11\")       Physical Exam  Vitals and nursing note reviewed.   Constitutional:       General: He is in acute distress.      Appearance: Normal appearance. He is normal weight.

## 2025-02-18 NOTE — DISCHARGE INSTRUCTIONS
Remove the packing in 2 days; change the dressing daily.    Consider following up with a dermatologist at some point to have the entire likely's sebaceous cyst sac removed completely.

## 2025-03-05 ENCOUNTER — APPOINTMENT (OUTPATIENT)
Facility: HOSPITAL | Age: 35
End: 2025-03-05
Payer: COMMERCIAL

## 2025-03-05 ENCOUNTER — HOSPITAL ENCOUNTER (EMERGENCY)
Facility: HOSPITAL | Age: 35
Discharge: HOME | End: 2025-03-05
Payer: COMMERCIAL

## 2025-03-05 VITALS
WEIGHT: 200 LBS | SYSTOLIC BLOOD PRESSURE: 141 MMHG | BODY MASS INDEX: 28 KG/M2 | OXYGEN SATURATION: 96 % | TEMPERATURE: 98.7 F | HEIGHT: 71 IN | RESPIRATION RATE: 18 BRPM | DIASTOLIC BLOOD PRESSURE: 93 MMHG | HEART RATE: 108 BPM

## 2025-03-05 DIAGNOSIS — M25.561 ACUTE PAIN OF RIGHT KNEE: ICD-10-CM

## 2025-03-05 DIAGNOSIS — M25.511 ACUTE PAIN OF BOTH SHOULDERS: Primary | ICD-10-CM

## 2025-03-05 DIAGNOSIS — M25.512 ACUTE PAIN OF BOTH SHOULDERS: Primary | ICD-10-CM

## 2025-03-05 PROCEDURE — 73030 X-RAY EXAM OF SHOULDER: CPT

## 2025-03-05 PROCEDURE — 99283 EMERGENCY DEPT VISIT LOW MDM: CPT

## 2025-03-05 PROCEDURE — 73562 X-RAY EXAM OF KNEE 3: CPT

## 2025-03-05 RX ORDER — METOPROLOL SUCCINATE 25 MG/1
25 TABLET, EXTENDED RELEASE ORAL DAILY
COMMUNITY
Start: 2024-12-16

## 2025-03-05 RX ORDER — NAPROXEN 500 MG/1
500 TABLET ORAL 2 TIMES DAILY
Qty: 14 TABLET | Refills: 0 | Status: SHIPPED | OUTPATIENT
Start: 2025-03-05 | End: 2025-03-12

## 2025-03-05 RX ORDER — LOSARTAN POTASSIUM 50 MG/1
50 TABLET ORAL DAILY
COMMUNITY
Start: 2024-12-16

## 2025-03-05 RX ORDER — FUROSEMIDE 40 MG/1
40 TABLET ORAL DAILY
COMMUNITY
Start: 2024-12-16

## 2025-03-05 ASSESSMENT — PAIN - FUNCTIONAL ASSESSMENT
PAIN_FUNCTIONAL_ASSESSMENT: ACTIVITIES ARE NOT PREVENTED
PAIN_FUNCTIONAL_ASSESSMENT: 0-10

## 2025-03-05 ASSESSMENT — PAIN DESCRIPTION - ORIENTATION: ORIENTATION: RIGHT;LEFT

## 2025-03-05 ASSESSMENT — LIFESTYLE VARIABLES
HOW MANY STANDARD DRINKS CONTAINING ALCOHOL DO YOU HAVE ON A TYPICAL DAY: 5 OR 6
HOW OFTEN DO YOU HAVE A DRINK CONTAINING ALCOHOL: 2-3 TIMES A WEEK

## 2025-03-05 ASSESSMENT — PAIN DESCRIPTION - DESCRIPTORS: DESCRIPTORS: STABBING

## 2025-03-05 ASSESSMENT — PAIN DESCRIPTION - LOCATION: LOCATION: SHOULDER

## 2025-03-05 ASSESSMENT — PAIN SCALES - GENERAL: PAINLEVEL_OUTOF10: 7

## 2025-03-06 NOTE — ED TRIAGE NOTES
Pt having shoulder pain in both shoulder pain bilateraly for 2 months.  States  he feels the pain going down to his fingers on right.  Pt states right knee has been swollen for 2-3 weeks

## 2025-03-06 NOTE — ED PROVIDER NOTES
Vitals:    03/05/25 1953   BP: (!) 141/93   Pulse: (!) 108   Resp: 18   Temp: 98.7 °F (37.1 °C)   TempSrc: Oral   SpO2: 96%   Weight: 90.7 kg (200 lb)   Height: 1.803 m (5' 11\")       Physical Exam  Vitals and nursing note reviewed.   Constitutional:       General: He is not in acute distress.     Appearance: Normal appearance. He is not ill-appearing, toxic-appearing or diaphoretic.   HENT:      Head: Normocephalic and atraumatic.   Cardiovascular:      Rate and Rhythm: Normal rate and regular rhythm.   Pulmonary:      Effort: Pulmonary effort is normal.      Breath sounds: Normal breath sounds.   Musculoskeletal:         General: Normal range of motion.      Right shoulder: Normal.      Left shoulder: Normal.      Cervical back: Normal range of motion and neck supple.      Right knee: Bony tenderness present. No swelling, deformity, erythema or crepitus. Normal range of motion. Tenderness present over the lateral joint line. Normal alignment.      Comments:  strength 5/5, radial pulse 2+   Skin:     General: Skin is warm.      Findings: No rash.   Neurological:      General: No focal deficit present.      Mental Status: He is alert and oriented to person, place, and time.      Cranial Nerves: No cranial nerve deficit.      Sensory: No sensory deficit.      Motor: No weakness.         Diagnostic Study Results     Labs -   No results found for this or any previous visit (from the past 12 hour(s)). Labs Reviewed - No data to display    Radiologic Studies -   XR KNEE RIGHT (3 VIEWS)   Final Result   1.  No acute fracture or dislocation. No significant soft tissue swelling or   joint effusion.      Electronically signed by Philip Haas      XR SHOULDER LEFT (MIN 2 VIEWS)   Final Result   1.  No acute fracture or dislocation. No significant soft tissue swelling or   joint effusion.      Electronically signed by Philip Haas      XR SHOULDER RIGHT (MIN 2 VIEWS)   Final Result   1.  No acute

## 2025-04-03 ENCOUNTER — HOSPITAL ENCOUNTER (EMERGENCY)
Facility: HOSPITAL | Age: 35
Discharge: HOME | End: 2025-04-03
Payer: COMMERCIAL

## 2025-04-03 ENCOUNTER — APPOINTMENT (OUTPATIENT)
Facility: HOSPITAL | Age: 35
End: 2025-04-03
Payer: COMMERCIAL

## 2025-04-03 VITALS
BODY MASS INDEX: 28.7 KG/M2 | OXYGEN SATURATION: 96 % | RESPIRATION RATE: 18 BRPM | WEIGHT: 205 LBS | TEMPERATURE: 98.1 F | SYSTOLIC BLOOD PRESSURE: 161 MMHG | HEIGHT: 71 IN | DIASTOLIC BLOOD PRESSURE: 93 MMHG | HEART RATE: 92 BPM

## 2025-04-03 DIAGNOSIS — M25.561 CHRONIC PAIN OF RIGHT KNEE: Primary | ICD-10-CM

## 2025-04-03 DIAGNOSIS — G89.29 CHRONIC PAIN OF RIGHT KNEE: Primary | ICD-10-CM

## 2025-04-03 PROCEDURE — 73560 X-RAY EXAM OF KNEE 1 OR 2: CPT

## 2025-04-03 PROCEDURE — 99283 EMERGENCY DEPT VISIT LOW MDM: CPT

## 2025-04-03 RX ORDER — MELOXICAM 7.5 MG/1
7.5 TABLET ORAL DAILY
Qty: 30 TABLET | Refills: 0 | Status: SHIPPED | OUTPATIENT
Start: 2025-04-03

## 2025-04-03 ASSESSMENT — PAIN DESCRIPTION - ORIENTATION: ORIENTATION: RIGHT

## 2025-04-03 ASSESSMENT — PAIN SCALES - GENERAL: PAINLEVEL_OUTOF10: 8

## 2025-04-03 ASSESSMENT — PAIN DESCRIPTION - DESCRIPTORS: DESCRIPTORS: SHARP

## 2025-04-03 ASSESSMENT — PAIN DESCRIPTION - LOCATION: LOCATION: LEG;KNEE

## 2025-04-03 ASSESSMENT — PAIN DESCRIPTION - PAIN TYPE: TYPE: ACUTE PAIN

## 2025-04-03 ASSESSMENT — ENCOUNTER SYMPTOMS: COLOR CHANGE: 0

## 2025-04-03 ASSESSMENT — PAIN - FUNCTIONAL ASSESSMENT: PAIN_FUNCTIONAL_ASSESSMENT: 0-10

## 2025-04-03 NOTE — ED TRIAGE NOTES
Pt came ambulatory to triage c/o right thigh and right knee pain x 2mos.    Denies injury or trauma.

## 2025-04-03 NOTE — ED PROVIDER NOTES
EMERGENCY DEPARTMENT HISTORY AND PHYSICAL EXAM    Date: 4/3/2025  Patient Name: Tyrone Wilkins    History of Presenting Illness     Chief Complaint   Patient presents with    Knee Pain    Leg Pain         History Provided By:Patient     Chief Complaint: R knee pain  Duration: 2 months  Timing:  chronic   Location: R knee TPP at medial proximal tibia   Quality: sharp   Severity: 7/10  Modifying Factors: none  Associated Symptoms: numbness and tingling radiating into R lower leg and medial border of foot       Additional History (Context): Tyrone Wilkins is a 34 y.o. male with PMH of CHF who presents with 2 months of sharp right knee pain with associated numbness and tingling radiating into R lower leg and medial border of foot. He cannot recall an inciting event and says he woke up one morning with the pain. He had an XR in march that showed no acute findings. Pain rated at a 7/10. He does endorse some pain located posteriorly behind the knee but denies any recent travel. He denies fever, chills, and the knee being warm to touch or erythematous. He endorses some R LE weakness. He has had no improvement with naproxen, advil, tylenol, ice, or tiger balm. He notes this is not affecting his work but is anxious that it might start to impact his ability to work.     PCP: Marietta Garcia MD    No current facility-administered medications for this encounter.     Current Outpatient Medications   Medication Sig Dispense Refill    meloxicam (MOBIC) 7.5 MG tablet Take 1 tablet by mouth daily 30 tablet 0    furosemide (LASIX) 40 MG tablet Take 1 tablet by mouth daily      losartan (COZAAR) 50 MG tablet Take 1 tablet by mouth daily      metoprolol succinate (TOPROL XL) 25 MG extended release tablet Take 1 tablet by mouth daily      naproxen (NAPROSYN) 500 MG tablet Take 1 tablet by mouth 2 times daily for 7 days 14 tablet 0    acetaminophen (TYLENOL) 500 MG tablet Take 2 tablets by mouth 4 times daily as needed for Pain 30 tablet 1  medial tibia) present. No medial joint line or lateral joint line tenderness. Normal pulse.      Instability Tests: Posterior drawer test negative. Anterior Lachman test negative. Medial Damien test positive.      Left knee: Normal pulse.      Instability Tests: Medial Damien test negative.   Skin:     General: Skin is warm and dry.      Capillary Refill: Capillary refill takes less than 2 seconds.      Findings: No bruising, erythema or lesion.   Neurological:      General: No focal deficit present.      Mental Status: He is alert and oriented to person, place, and time. Mental status is at baseline.      Sensory: No sensory deficit.      Motor: No weakness.      Comments: Gait is steady and patient exhibits no evidence of ataxia. Patient is able to ambulate without difficulty. No focal neurological deficit noted. No facial droop, slurred speech, or evidence of altered mentation noted on exam.     Psychiatric:         Mood and Affect: Mood normal.         Behavior: Behavior normal.         Thought Content: Thought content normal.         Judgment: Judgment normal.                Diagnostic Study Results     Labs -   No results found for this or any previous visit (from the past 12 hours).    Radiologic Studies -   XR KNEE RIGHT (1-2 VIEWS)    (Results Pending)     [unfilled]  [unfilled]      Medical Decision Making   I am the first provider for this patient.    I reviewed the vital signs, available nursing notes, past medical history, past surgical history, family history and social history.    Vital Signs-Reviewed the patient's vital signs.    Records Reviewed: Ceci Mcbride PA-C   Procedures:  Procedures    Provider Notes (Medical Decision Making): Impression: chronic knee pain     Knee x-ray unchanged compared to prior, no acute process, will plan to d/c with mobic and close ortho follow-up. Return precautions advised. Pt agrees. Ceci Mcbride PA-C       Dictation disclaimer:  Please note that this dictation was